# Patient Record
(demographics unavailable — no encounter records)

---

## 2024-10-15 NOTE — HISTORY OF PRESENT ILLNESS
[de-identified] : fever, runny nose, eyes watery x 1 day. tylenol at 430pm [FreeTextEntry6] : DIEUDONNE  is here today for a history of congestion, fever  cranky fever 100 x1 day feeding well concerned has ear infection, sister on antibiotics last otitis media 3/2024

## 2024-10-15 NOTE — DISCUSSION/SUMMARY
[FreeTextEntry1] :  history murmur evaluated by cardiology Symptomatic treatment discussed including appropriate use of over the counter pain reliever. Handwashing and Infection control Medication as prescribed. amoxicilin for 10 days Next Visit in two weeks or  to return earlier as needed

## 2024-10-15 NOTE — PHYSICAL EXAM
[TextEntry] : Gen: Awake, alert,  In no acute distress , well appearing Eyes: no periorbital swelling Ears : right  External Auditory Canal:  Normal. Tympanic Membrane:  Erythematous. fluid            left External Auditory Canal:  Normal   Tympanic Membrane:  Normal Nose:  nasal discharge Pharynx: no redness ,no sores, not inflamed  Neck supple Lymph: anterior and submandibular glands no lymphadenopathy Cardiac : normal rate, regular rhythm, S1,S2 normal,1/6  murmur Lungs: clear to auscultation,

## 2024-10-30 NOTE — PHYSICAL EXAM
[TextEntry] : Gen: Awake, alert,  In no acute distress , well appearing Eyes: no periorbital swelling Ears :   right Tympanic Membrane:  Normal               left tympanic Membrane:  Normal Pharynx: no redness ,no sores, not inflamed  teething Neck supple Cardiac : normal rate, regular rhythm, S1,S2 normal, no murmur Lungs: clear to auscultation

## 2024-10-30 NOTE — HISTORY OF PRESENT ILLNESS
[de-identified] : Follow up ear recheck, finished ABx but pt still pulling on right ear. [FreeTextEntry6] : DIEUDONNE is here today for follow up right  ear infection, pulling ear completed amoxicillin  doing well pulling right ear teething

## 2024-10-30 NOTE — HISTORY OF PRESENT ILLNESS
[de-identified] : Follow up ear recheck, finished ABx but pt still pulling on right ear. [FreeTextEntry6] : DIEUDONNE is here today for follow up right  ear infection, pulling ear completed amoxicillin  doing well pulling right ear teething

## 2024-12-11 NOTE — HISTORY OF PRESENT ILLNESS
[Mother] : mother [Cow's milk (Ounces per day ___)] : consumes [unfilled] oz of Cow's milk per day [Table food] : table food [Normal] : Normal [Brushing teeth] : Brushing teeth [Playtime] : Playtime  [Ready for Toilet Training] : ready for toilet training [No] : No cigarette smoke exposure [Water heater temperature set at <120 degrees F] : Water heater temperature set at <120 degrees F [Car seat in back seat] : Car seat in back seat [Carbon Monoxide Detectors] : Carbon monoxide detectors [Smoke Detectors] : Smoke detectors [Exposure to electronic nicotine delivery system] : No exposure to electronic nicotine delivery system [Up to date] : Up to date [FreeTextEntry7] : 18 mo c [NO] : No

## 2024-12-11 NOTE — PHYSICAL EXAM

## 2024-12-11 NOTE — DISCUSSION/SUMMARY
[Normal Growth] : growth [Normal Development] : development [None] : No known medical problems [No Elimination Concerns] : elimination [No Feeding Concerns] : feeding [No Skin Concerns] : skin [Normal Sleep Pattern] : sleep [Family Support] : family support [Child Development and Behavior] : child development and behavior [Language Promotion/Hearing] : language promotion/hearing [Toliet Training Readiness] : toliet training readiness [Safety] : safety [No Medications] : ~He/She~ is not on any medications [Parent/Guardian] : parent/guardian [] : The components of the vaccine(s) to be administered today are listed in the plan of care. The disease(s) for which the vaccine(s) are intended to prevent and the risks have been discussed with the caretaker.  The risks are also included in the appropriate vaccination information statements which have been provided to the patient's caregiver.  The caregiver has given consent to vaccinate. [FreeTextEntry1] : Continue whole cow's milk. Continue table foods, 3 meals with 2-3 snacks per day. Incorporate flourinated water daily in a sippy cup. Brush teeth twice a day with soft toothbrush. Recommend visit to dentist. When in car, keep child in rear-facing car seats until age 2, or until  the maximum height and weight for seat is reached. Put todder to sleep in own bed or crib. Help toddler to maintain consistent daily routines and sleep schedule. Toilet training discussed. Recognize anxiety in new settings. Ensure home is safe. Be within arm's reach of toddler at all times. Use consistent, positive discipline. Read aloud to toddler. Declines flu vaccine SWYC and MCHAT reviewed

## 2024-12-11 NOTE — DEVELOPMENTAL MILESTONES
[Normal Development] : Normal Development [None] : none [FreeTextEntry1] : [place] reviewed and age appropriate [Passed] : passed [Yes] : Completed.

## 2025-01-15 NOTE — HISTORY OF PRESENT ILLNESS
[de-identified] : 21month old f c/o rash behind left leg for a few days [FreeTextEntry6] : no new soaps/diapers rash on left buttock no rash in genital area no diarrhea

## 2025-01-15 NOTE — PHYSICAL EXAM
[NL] : no acute distress, alert [de-identified] : several papules with excoriations left buttock, no pustules or satellite lesions

## 2025-02-12 NOTE — HISTORY OF PRESENT ILLNESS
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

## 2025-04-24 NOTE — PHYSICAL EXAM
[TextEntry] : General Appearance:  Awake,  Alert  In no acute distress well appearing uncooperative, saying many words, words together crying during exam tried to recheck weight Neck:  supple. Eyes:   Pupils:  PERRL Ears: bilateral  Tympanic Membrane:  Pearly with light reflex bilaterally. Pharynx:Normal findings  non erythematous pharynx Lungs:  Clear to auscultation. Cardiovascular: Heart Rate And Rhythm:  Regular. Heart Sounds:  Normal. Murmurs:  No murmurs were heard. Abdomen: Palpation:  Soft.   Genitalia: Edson 1  normal female external genitalia   Musculoskeletal System: General/bilateral:  Normal movement of all extremities.   Normal spine and back. Hips: General/bilateral:  An Ortolani test of the hips was negative.   Kline's test of the hips was negative Neurological:Motor:  Normal muscle tone.

## 2025-04-24 NOTE — DEVELOPMENTAL MILESTONES
[Passed] : passed [FreeTextEntry1] : [place]:  Gross Motor  2y4     Fine Motor     [id number]    3y1    Language 3y2

## 2025-05-04 NOTE — PLAN
[TextEntry] : COUNSELING/EDUCATION has seen dentist declines fluoride Nutritional counseling: less tv Reviewed  5-2-1-0 Healthy Habits Questionnaire weight discussed, decreased percentiles at urgent care visit to check weight  Immunizations reviewed CARE COORDINATION PLAN reviewed    The following 2 year anticipatory guidance topics were discussed and/or handouts given: assessment of language development, temperament and behavior, toilet training, tv viewing and safety.  Follow up in one year  Information discussed with parent/guardian. Never

## 2025-05-28 NOTE — DISCUSSION/SUMMARY
[FreeTextEntry1] : D/W caregiver otitis media, antibiotics as below, reviewed supportive care including antipyretics, nasal saline and fluid intake; reviewed monitor for persistent fever, worsening ear pain, dehydration and call if occurring for recheck. time spent: 30min

## 2025-05-28 NOTE — HISTORY OF PRESENT ILLNESS
[FreeTextEntry6] :  + congestion X 3-4days, + pulling ears no cough, no ST, no ear pain, no n/v/c/d, eating less and drinking well, normal voiding. afebrile. no COVID or flu exposure

## 2025-05-28 NOTE — REVIEW OF SYSTEMS
[Fever] : no fever [Ear Tugging] : ear tugging [Nasal Congestion] : nasal congestion [Sore Throat] : no sore throat [Cough] : no cough [Vomiting] : no vomiting [Diarrhea] : no diarrhea

## 2025-06-11 NOTE — HISTORY OF PRESENT ILLNESS
[de-identified] : ear recheck - pt doing well [FreeTextEntry6] : Pt dx with OM 5/28/25- completed amoxicillin, feeling well, no fevers, eating/drinking well, no ear pain

## 2025-06-11 NOTE — PHYSICAL EXAM
[NL] : regular rate and rhythm, normal S1, S2 audible, no murmurs [FreeTextEntry3] : serous otitis effusion right, no pus, no erythema, no bulging, left TM transluscent

## 2025-06-11 NOTE — DISCUSSION/SUMMARY
[FreeTextEntry1] : D/W parent resolved OM, serous otitis effusion should self resolved, continue supportive care as needed, call with concerns.  time spent: 25min

## 2025-06-26 NOTE — PHYSICAL EXAM
[TextEntry] : General: alert and active in no apparent distress, playful Eyes: conjunctiva clear, EOMI Ears: BL TMs erythematous, dull, opaque, normal canals Nose: no rhinorrhea OP: no tonsillar enlargement/exudate, no lesions, no posterior pharyngeal erythema Neck: supple, no adenopathy Lungs: clear to auscultation bilaterally, good air exchange, no retractions, comfortable WOB CVS: Normal rate, regular rhythm, no murmur Abdomen: soft, nondistended, nontender, and no hepatosplenomegaly or masses, normoactive bowel sounds Skin: No rashes, lesions or skin changes

## 2025-06-26 NOTE — PLAN
[TextEntry] :  OTITIS MEDIA: - Treat with medication per order - Symptomatic treatment with acetaminophen or ibuprofen prn - Follow up if symptoms are worsening  VIRAL UPPER RESPIRATORY INFECTION: - Maintain hydration - Saline nose drops - Supportive care with fluids and rest - Follow up if symptoms are worsening

## 2025-06-26 NOTE — HISTORY OF PRESENT ILLNESS
[de-identified] : cough congestion  [FreeTextEntry6] : In addition to above: cough, congestion x 2 days denies fevers, ST, headaches, ear pain, N/V/D/abd pain, or rash Appetite nml Sister also sick with similar symptoms

## 2025-07-17 NOTE — HISTORY OF PRESENT ILLNESS
[de-identified] : 2yr old f f/u ear infection, doing well. [FreeTextEntry6] : HERE FOR EAR RECHECK  completed augmentin no adverse effects sleeping well

## 2025-07-17 NOTE — HISTORY OF PRESENT ILLNESS
[de-identified] : 2yr old f f/u ear infection, doing well. [FreeTextEntry6] : HERE FOR EAR RECHECK  completed augmentin no adverse effects sleeping well